# Patient Record
Sex: FEMALE | Race: WHITE
[De-identification: names, ages, dates, MRNs, and addresses within clinical notes are randomized per-mention and may not be internally consistent; named-entity substitution may affect disease eponyms.]

---

## 2017-07-17 ENCOUNTER — HOSPITAL ENCOUNTER (OUTPATIENT)
Dept: HOSPITAL 8 - CFH | Age: 52
Discharge: HOME | End: 2017-07-17
Attending: OBSTETRICS & GYNECOLOGY
Payer: COMMERCIAL

## 2017-07-17 DIAGNOSIS — Z12.31: Primary | ICD-10-CM

## 2017-07-17 PROCEDURE — G0202 SCR MAMMO BI INCL CAD: HCPCS

## 2018-07-23 ENCOUNTER — HOSPITAL ENCOUNTER (OUTPATIENT)
Dept: HOSPITAL 8 - CFH | Age: 53
Discharge: HOME | End: 2018-07-23
Attending: OBSTETRICS & GYNECOLOGY
Payer: COMMERCIAL

## 2018-07-23 DIAGNOSIS — Z12.31: Primary | ICD-10-CM

## 2018-07-23 DIAGNOSIS — R92.1: ICD-10-CM

## 2021-02-11 ENCOUNTER — HOSPITAL ENCOUNTER (OUTPATIENT)
Dept: HOSPITAL 8 - CFH | Age: 56
Discharge: HOME | End: 2021-02-11
Attending: OBSTETRICS & GYNECOLOGY
Payer: COMMERCIAL

## 2021-02-11 DIAGNOSIS — Z12.31: Primary | ICD-10-CM

## 2021-02-11 PROCEDURE — 77063 BREAST TOMOSYNTHESIS BI: CPT

## 2021-02-11 PROCEDURE — 77067 SCR MAMMO BI INCL CAD: CPT

## 2024-04-18 ENCOUNTER — OFFICE VISIT (OUTPATIENT)
Dept: URGENT CARE | Facility: PHYSICIAN GROUP | Age: 59
End: 2024-04-18
Payer: COMMERCIAL

## 2024-04-18 ENCOUNTER — HOSPITAL ENCOUNTER (OUTPATIENT)
Facility: MEDICAL CENTER | Age: 59
End: 2024-04-18
Attending: NURSE PRACTITIONER
Payer: COMMERCIAL

## 2024-04-18 VITALS
WEIGHT: 132 LBS | DIASTOLIC BLOOD PRESSURE: 62 MMHG | RESPIRATION RATE: 12 BRPM | HEIGHT: 62 IN | BODY MASS INDEX: 24.29 KG/M2 | SYSTOLIC BLOOD PRESSURE: 120 MMHG | HEART RATE: 76 BPM | OXYGEN SATURATION: 96 % | TEMPERATURE: 98.4 F

## 2024-04-18 DIAGNOSIS — R30.0 DYSURIA: ICD-10-CM

## 2024-04-18 DIAGNOSIS — N30.01 ACUTE CYSTITIS WITH HEMATURIA: ICD-10-CM

## 2024-04-18 DIAGNOSIS — R35.0 URINARY FREQUENCY: ICD-10-CM

## 2024-04-18 LAB
APPEARANCE UR: NORMAL
BILIRUB UR STRIP-MCNC: NEGATIVE MG/DL
COLOR UR AUTO: NORMAL
GLUCOSE UR STRIP.AUTO-MCNC: NEGATIVE MG/DL
KETONES UR STRIP.AUTO-MCNC: NEGATIVE MG/DL
LEUKOCYTE ESTERASE UR QL STRIP.AUTO: NORMAL
NITRITE UR QL STRIP.AUTO: NEGATIVE
PH UR STRIP.AUTO: 6 [PH] (ref 5–8)
PROT UR QL STRIP: 100 MG/DL
RBC UR QL AUTO: NORMAL
SP GR UR STRIP.AUTO: 1.02
UROBILINOGEN UR STRIP-MCNC: 0.2 MG/DL

## 2024-04-18 PROCEDURE — 99204 OFFICE O/P NEW MOD 45 MIN: CPT | Performed by: NURSE PRACTITIONER

## 2024-04-18 PROCEDURE — 81002 URINALYSIS NONAUTO W/O SCOPE: CPT | Performed by: NURSE PRACTITIONER

## 2024-04-18 PROCEDURE — 3078F DIAST BP <80 MM HG: CPT | Performed by: NURSE PRACTITIONER

## 2024-04-18 PROCEDURE — 3074F SYST BP LT 130 MM HG: CPT | Performed by: NURSE PRACTITIONER

## 2024-04-18 PROCEDURE — 87086 URINE CULTURE/COLONY COUNT: CPT

## 2024-04-18 PROCEDURE — 87077 CULTURE AEROBIC IDENTIFY: CPT

## 2024-04-18 PROCEDURE — 87186 SC STD MICRODIL/AGAR DIL: CPT

## 2024-04-18 RX ORDER — NITROFURANTOIN 25; 75 MG/1; MG/1
100 CAPSULE ORAL EVERY 12 HOURS
Qty: 10 CAPSULE | Refills: 0 | Status: SHIPPED | OUTPATIENT
Start: 2024-04-18 | End: 2024-04-23

## 2024-04-18 RX ORDER — AMOXICILLIN 500 MG/1
CAPSULE ORAL
COMMUNITY
Start: 2024-04-08 | End: 2024-04-18

## 2024-04-18 RX ORDER — CHLORHEXIDINE GLUCONATE ORAL RINSE 1.2 MG/ML
15 SOLUTION DENTAL
COMMUNITY
Start: 2024-04-08 | End: 2024-04-18

## 2024-04-18 RX ORDER — PHENAZOPYRIDINE HYDROCHLORIDE 200 MG/1
200 TABLET, FILM COATED ORAL 3 TIMES DAILY PRN
Qty: 6 TABLET | Refills: 0 | Status: SHIPPED | OUTPATIENT
Start: 2024-04-18

## 2024-04-18 RX ORDER — IBUPROFEN 600 MG/1
600 TABLET ORAL
COMMUNITY
Start: 2024-04-08 | End: 2024-04-18

## 2024-04-18 ASSESSMENT — FIBROSIS 4 INDEX: FIB4 SCORE: 1.34

## 2024-04-18 NOTE — PROGRESS NOTES
"Jana Amin is a 58 y.o. female who presents for UTI (Burning sensation onset this morning)      HPI  This is a new problem. Jana Amin is a 58 y.o. patient who presents to urgent care with c/o: Urinary frequency and burning that started this morning.  Yesterday she noticed that she had a mild low back ache but did not think too much of it.  She denies vaginal discharge.  She no longer menstruates.  Her only underlying medical problem is hypothyroidism for which she takes levothyroxine.  She drinks a lot of water normally.No other aggravating or alleviating factors.       ROS See HPI    Allergies:     No Known Allergies    PMSFS Hx:  No past medical history on file.  No past surgical history on file.  No family history on file.  Social History     Tobacco Use    Smoking status: Never    Smokeless tobacco: Never   Substance Use Topics    Alcohol use: No       Problems:   There is no problem list on file for this patient.      Medications:   Current Outpatient Medications on File Prior to Visit   Medication Sig Dispense Refill    levothyroxine (SYNTHROID) 75 MCG Tab Take 1 Tab by mouth every day. 30 Tab 0     No current facility-administered medications on file prior to visit.        Objective:     /62 (BP Location: Right arm, Patient Position: Sitting, BP Cuff Size: Adult)   Pulse 76   Temp 36.9 °C (98.4 °F) (Temporal)   Resp 12   Ht 1.575 m (5' 2\")   Wt 59.9 kg (132 lb)   SpO2 96%   BMI 24.14 kg/m²     Physical Exam  Vitals and nursing note reviewed.   Constitutional:       General: She is not in acute distress.     Appearance: Normal appearance. She is well-developed. She is not ill-appearing or toxic-appearing.   HENT:      Head: Normocephalic.      Mouth/Throat:      Mouth: Mucous membranes are moist.   Cardiovascular:      Rate and Rhythm: Normal rate and regular rhythm.      Pulses: Normal pulses.      Heart sounds: Normal heart sounds.   Pulmonary:      Effort: " Pulmonary effort is normal.      Breath sounds: Normal breath sounds.   Abdominal:      Palpations: Abdomen is soft. Abdomen is not rigid.      Tenderness: There is no right CVA tenderness or left CVA tenderness.   Musculoskeletal:      Lumbar back: Normal.   Skin:     General: Skin is warm and dry.      Capillary Refill: Capillary refill takes less than 2 seconds.   Neurological:      Mental Status: She is alert and oriented to person, place, and time.   Psychiatric:         Mood and Affect: Mood normal.         Behavior: Behavior normal. Behavior is cooperative.       Results for orders placed or performed in visit on 04/18/24   POCT Urinalysis   Result Value Ref Range    POC Color orange Negative    POC Appearance cloudy Negative    POC Glucose negative Negative mg/dL    POC Bilirubin negative Negative mg/dL    POC Ketones negative Negative mg/dL    POC Specific Gravity 1.020 <1.005 - >1.030    POC Blood large Negative    POC Urine PH 6.0 5.0 - 8.0    POC Protein 100 Negative mg/dL    POC Urobiligen 0.2 Negative (0.2) mg/dL    POC Nitrites negative Negative    POC Leukocyte Esterase small Negative         Assessment /Associated Orders:      1. Acute cystitis with hematuria  Urine Culture    nitrofurantoin (MACROBID) 100 MG Cap      2. Dysuria  POCT Urinalysis    phenazopyridine (PYRIDIUM) 200 MG Tab      3. Urinary frequency  phenazopyridine (PYRIDIUM) 200 MG Tab            Medical Decision Making:    Jana is a very pleasant 58 y.o. female who is clinically stable at today's acute urgent care visit.  No acute distress noted.  VSS. Appropriate for outpatient care at this time.   Acute problem today with uncertain prognosis.   Educated in proper administration of  prescription medication(s) ordered today including safety, possible SE, risks, benefits, rationale and alternatives to therapy.   Keep well hydrated  Urine culture: pending     Discussed Dx, management options (risks,benefits, and alternatives to planned  treatment), natural progression and supportive care.  Expressed understanding and the treatment plan was agreed upon.   Questions were encouraged and answered   Return to urgent care prn if new or worsening sx or if there is no improvement in condition prn.    Educated in Red flags and indications to immediately call 911 or present to the Emergency Department.         Please note that this dictation was created using voice recognition software. I have worked with consultants from the vendor as well as technical experts from [facility] to optimize the interface. I have made every reasonable attempt to correct obvious errors, but I expect that there are errors of grammar and possibly content that I did not discover before finalizing the note.  This note was electronically signed by provider

## 2024-04-18 NOTE — LETTER
April 18, 2024       Patient: Jana Amin   YOB: 1965   Date of Visit: 4/18/2024         To Whom It May Concern:    In my medical opinion, I recommend that Jana Amin return to full duty, no restrictions on 04/19/24              Sincerely,          Gela Klein, A.P.N.  Electronically Signed

## 2024-04-18 NOTE — PATIENT INSTRUCTIONS
Infección urinaria en los adultos  Urinary Tract Infection, Adult  Tyler infección urinaria (IU) puede ocurrir en cualquier lugar de las vías urinarias. Las vías urinarias incluyen lo siguiente:  Los riñones.  Los uréteres.  La vejiga.  La uretra.  Estos órganos fabrican, almacenan y eliminan el pis (orina) del cuerpo.  ¿Cuáles son las causas?  La causa de esta infección es la presencia de gérmenes (bacterias) en la frederick genital. Estos gérmenes proliferan y causan hinchazón (inflamación) de las vías urinarias.  ¿Qué incrementa el riesgo?  Los siguientes factores pueden hacer que sea más propensa a contraer esta afección:  Usar un tubo gavin y pequeño (catéter) para drenar el pis.  Imposibilidad de controlar el momento de orinar o defecar (incontinencia).  Ser charlie. Si usted es charlie, estas cosas pueden aumentar el riesgo:  Usar estos métodos para evitar un embarazo:  Un medicamento que gregory los espermatozoides (espermicida).  Un dispositivo que impide el paso de los espermatozoides (diafragma).  Tener niveles bajos de tyler hormona femenina (estrógeno).  Estar embarazada.  Es más probable que sufra esta afección si:  Tiene genes que aumentan cabrales riesgo.  Es sexualmente activa.  Elena antibióticos.  Tiene dificultad para orinar debido a:  Cabrales próstata es más olga de lo normal, si usted es hombre.  Obstrucción en la parte del cuerpo que drena el pis de la vejiga.  Cálculo renal.  Un trastorno nervioso que afecta la vejiga.  No burton tyler cantidad suficiente de líquido.  No hace pis con la frecuencia suficiente.  Tiene otras afecciones, alondra:  Diabetes.  Un sistema que combate las enfermedades (sistema inmunitario) debilitado.  Anemia drepanocítica.  Gota.  Lesión en la columna vertebral.  ¿Cuáles son los signos o síntomas?  Los síntomas de esta afección incluyen:  Necesidad inmediata de hacer pis.  Hacer poca cantidad de pis con mucha frecuencia.  Dolor o ardor al hacer pis.  Ian en el pis.  Pis que huele mal o  anormal.  Dificultad para hacer pis.  Pis turbio.  Líquido que sale de la vagina, si es charlie.  Dolor en la al o en la parte baja de la espalda.  Otros síntomas pueden incluir los siguientes:  Vómitos.  Falta de apetito.  Sentirse confundido (confuso). Matilde puede ser el primer síntoma en los adultos mayores.  Sentirse cansado y malhumorado (irritable).  Fiebre.  Materia fecal líquida (diarrea).  ¿Cómo se trata?  Recibir antibióticos.  Recibir otros medicamentos.  Beber maria m cantidad suficiente de agua.  En algunos casos, es posible que deba consultar a un especialista.  Siga estas instrucciones en cabrales casa:    Medicamentos  Use los medicamentos de venta mayra y los recetados solamente alondra se lo haya indicado el médico.  Si le recetaron un antibiótico, tómelo alondra se lo haya indicado el médico. No deje de tomarlo aunque comience a sentirse mejor.  Instrucciones generales  Asegúrese de hacer lo siguiente:  Dusty pis hasta que la vejiga quede vacía.  No contenga el pis howard mucho tiempo.  Vaciar la vejiga después de tener sexo.  Límpiese de adelante hacia atrás después de hacer pis o defecar, si es charlie. Use cada trozo de papel higiénico solo maria m vez cuando se limpie.  Shae suficiente líquido alondra para mantener la orina de color amarillo pálido.  Cumpla con todas las visitas de seguimiento.  Comuníquese con un médico si:  No mejora después de 1 o 2 días de tratamiento.  Los síntomas desaparecen y luego reaparecen.  Solicite ayuda de inmediato si:  Tiene un dolor muy intenso en la espalda.  Tiene dolor muy intenso en la parte baja de la al.  Tiene fiebre.  Tiene escalofríos.  Se siente alondra si fuera a vomitar o vomita.  Resumen  Maria M infección urinaria (IU) puede ocurrir en cualquier lugar de las vías urinarias.  Esta afección es causada por la presencia de gérmenes en la frederick genital.  Existen muchos factores de riesgo de sufrir maria m IU.  El tratamiento incluye antibióticos.  Shae suficiente líquido alondra para  mantener la orina de color amarillo pálido.  Esta información no tiene alondra fin reemplazar el consejo del médico. Asegúrese de hacerle al médico cualquier pregunta que tenga.  Document Revised: 10/29/2021 Document Reviewed: 10/29/2021  Elsevier Patient Education © 2023 [employer]

## 2024-04-21 LAB
BACTERIA UR CULT: ABNORMAL
BACTERIA UR CULT: ABNORMAL
SIGNIFICANT IND 70042: ABNORMAL
SITE SITE: ABNORMAL
SOURCE SOURCE: ABNORMAL

## 2024-04-24 DIAGNOSIS — Z16.12 UTI DUE TO EXTENDED-SPECTRUM BETA LACTAMASE (ESBL) PRODUCING ESCHERICHIA COLI: ICD-10-CM

## 2024-04-24 DIAGNOSIS — B96.29 UTI DUE TO EXTENDED-SPECTRUM BETA LACTAMASE (ESBL) PRODUCING ESCHERICHIA COLI: ICD-10-CM

## 2024-04-24 DIAGNOSIS — N39.0 UTI DUE TO EXTENDED-SPECTRUM BETA LACTAMASE (ESBL) PRODUCING ESCHERICHIA COLI: ICD-10-CM

## 2024-04-24 RX ORDER — CIPROFLOXACIN 500 MG/1
500 TABLET, FILM COATED ORAL 2 TIMES DAILY
Qty: 10 TABLET | Refills: 0 | Status: SHIPPED | OUTPATIENT
Start: 2024-04-24 | End: 2024-04-29

## 2024-04-24 NOTE — PROGRESS NOTES
Change antibiotic secondary to intermediate sensitivity to nitrofurantoin that she had been prescribed. Change to Cipro which shows good sensitivity.   Pt notified by telephone of results and need to change antibiotic.

## 2024-12-28 ENCOUNTER — OFFICE VISIT (OUTPATIENT)
Dept: URGENT CARE | Facility: PHYSICIAN GROUP | Age: 59
End: 2024-12-28
Payer: COMMERCIAL

## 2024-12-28 VITALS
HEART RATE: 74 BPM | OXYGEN SATURATION: 98 % | SYSTOLIC BLOOD PRESSURE: 114 MMHG | TEMPERATURE: 98.4 F | BODY MASS INDEX: 24.84 KG/M2 | DIASTOLIC BLOOD PRESSURE: 68 MMHG | RESPIRATION RATE: 18 BRPM | HEIGHT: 62 IN | WEIGHT: 135 LBS

## 2024-12-28 DIAGNOSIS — J02.9 PHARYNGITIS, UNSPECIFIED ETIOLOGY: ICD-10-CM

## 2024-12-28 LAB — S PYO DNA SPEC NAA+PROBE: NOT DETECTED

## 2024-12-28 PROCEDURE — 87651 STREP A DNA AMP PROBE: CPT | Performed by: PHYSICIAN ASSISTANT

## 2024-12-28 PROCEDURE — 99213 OFFICE O/P EST LOW 20 MIN: CPT | Performed by: PHYSICIAN ASSISTANT

## 2024-12-28 RX ORDER — DEXAMETHASONE SODIUM PHOSPHATE 10 MG/ML
10 INJECTION INTRAMUSCULAR; INTRAVENOUS ONCE
Status: COMPLETED | OUTPATIENT
Start: 2024-12-28 | End: 2024-12-28

## 2024-12-28 RX ADMIN — DEXAMETHASONE SODIUM PHOSPHATE 10 MG: 10 INJECTION INTRAMUSCULAR; INTRAVENOUS at 14:45

## 2024-12-28 ASSESSMENT — ENCOUNTER SYMPTOMS
ABDOMINAL PAIN: 0
SINUS PAIN: 0
SORE THROAT: 1
HEADACHES: 0
DIZZINESS: 0
WHEEZING: 0
VOMITING: 0
DIARRHEA: 0
EYE REDNESS: 0
FEVER: 0
CHILLS: 0
CONSTIPATION: 0
COUGH: 0
EYE DISCHARGE: 0
DIAPHORESIS: 0
EYE PAIN: 0
NAUSEA: 0
SHORTNESS OF BREATH: 0

## 2024-12-28 ASSESSMENT — FIBROSIS 4 INDEX: FIB4 SCORE: 1.36

## 2024-12-28 NOTE — PROGRESS NOTES
"  Subjective:     Jana Amin  is a 59 y.o. female who presents for Pharyngitis ( 3 days )       Presents today for a sore throat x3 days. Pain with swallowing, no difficulties with swallowing. Has associated body aches and bilateral ear discomfort. No ear drainage or discharge. No neck pain or neck stiffness. Denies fever/chills/sweats, chest pain, shortness of breath, nausea/vomiting, abdominal pain, diarrhea. Has used OTC medications for symptom support     Review of Systems   Constitutional:  Positive for malaise/fatigue. Negative for chills, diaphoresis and fever.   HENT:  Positive for ear pain and sore throat. Negative for congestion, ear discharge and sinus pain.    Eyes:  Negative for pain, discharge and redness.   Respiratory:  Negative for cough, shortness of breath and wheezing.    Cardiovascular:  Negative for chest pain.   Gastrointestinal:  Negative for abdominal pain, constipation, diarrhea, nausea and vomiting.   Neurological:  Negative for dizziness and headaches.      No Known Allergies  History reviewed. No pertinent past medical history.     Objective:   /68 (BP Location: Left arm, Patient Position: Sitting, BP Cuff Size: Adult)   Pulse 74   Temp 36.9 °C (98.4 °F) (Temporal)   Resp 18   Ht 1.575 m (5' 2\")   Wt 61.2 kg (135 lb)   SpO2 98%   BMI 24.69 kg/m²   Physical Exam  Vitals and nursing note reviewed.   Constitutional:       General: She is not in acute distress.     Appearance: Normal appearance. She is not ill-appearing, toxic-appearing or diaphoretic.   HENT:      Head: Normocephalic.      Right Ear: Tympanic membrane, ear canal and external ear normal. There is no impacted cerumen.      Left Ear: Tympanic membrane, ear canal and external ear normal. There is no impacted cerumen.      Nose: No congestion or rhinorrhea.      Mouth/Throat:      Mouth: Mucous membranes are moist.      Pharynx: Posterior oropharyngeal erythema present. No oropharyngeal exudate.     "  Comments: No tonsillar swelling, bilaterally.  No soft tissue swelling of the sublingual mucosa, no swelling of the soft or hard palate, no unilarteral oral pharynx swelling, no uvular deviation.    Eyes:      General:         Right eye: No discharge.         Left eye: No discharge.      Conjunctiva/sclera: Conjunctivae normal.   Cardiovascular:      Rate and Rhythm: Normal rate and regular rhythm.   Pulmonary:      Effort: Pulmonary effort is normal. No respiratory distress.      Breath sounds: Normal breath sounds. No stridor. No wheezing or rhonchi.   Musculoskeletal:      Cervical back: Neck supple.   Lymphadenopathy:      Cervical: No cervical adenopathy.   Neurological:      General: No focal deficit present.      Mental Status: She is alert and oriented to person, place, and time.   Psychiatric:         Mood and Affect: Mood normal.         Behavior: Behavior normal.         Thought Content: Thought content normal.         Judgment: Judgment normal.           Diagnostic testing:     Rosalba Strep -negative, notified via phone call    Assessment/Plan:     Encounter Diagnoses   Name Primary?    Pharyngitis, unspecified etiology          Plan for care for today's complaint includes obtaining strep testing today, this was negative.  Patient is likely suffering from a viral upper respiratory illness, no evidence to support antibiotic use at this time.  Provided 10 mg oral Decadron in office today for symptom support.  I considered other causes of pharyngitis including Group C, G strep, oral thrush, peritonsillar abscess, Mononucleosis, ro's angina, and retropharyngeal abscess but the patient's reported symptoms and my exam do not support these alternative diagnosis based on information I have available today.  Encouraged to use warm salt water gargles, alternate tylenol and ibuprofen for pain, consume soft foods and cool liquids for additional symptom support.  Vital signs were stable during today's office  visit, patient was overall well-appearing. Continue to monitor symptoms and return to urgent care or follow-up with primary care provider if symptoms remain ongoing.  Follow-up in the emergency department if symptoms become severe, ER precautions discussed in office today.    See AVS Instructions below for written guidance provided to patient on after-visit management and care in addition to our verbal discussion during the visit.    Please note that this dictation was created using voice recognition software. I have attempted to correct all errors, but there may be sound-alike, spelling, grammar and possibly content errors that I did not discover before finalizing the note.    Anthony Spence PA-C